# Patient Record
Sex: MALE | Race: WHITE | NOT HISPANIC OR LATINO
[De-identification: names, ages, dates, MRNs, and addresses within clinical notes are randomized per-mention and may not be internally consistent; named-entity substitution may affect disease eponyms.]

---

## 2019-04-01 ENCOUNTER — FORM ENCOUNTER (OUTPATIENT)
Age: 34
End: 2019-04-01

## 2019-04-02 ENCOUNTER — OUTPATIENT (OUTPATIENT)
Dept: OUTPATIENT SERVICES | Facility: HOSPITAL | Age: 34
LOS: 1 days | End: 2019-04-02
Payer: COMMERCIAL

## 2019-04-02 ENCOUNTER — APPOINTMENT (OUTPATIENT)
Dept: ORTHOPEDIC SURGERY | Facility: CLINIC | Age: 34
End: 2019-04-02
Payer: COMMERCIAL

## 2019-04-02 PROBLEM — Z00.00 ENCOUNTER FOR PREVENTIVE HEALTH EXAMINATION: Status: ACTIVE | Noted: 2019-04-02

## 2019-04-02 LAB
B PERT IGG+IGM PNL SER: SIGNIFICANT CHANGE UP
COLOR FLD: SIGNIFICANT CHANGE UP
FLUID INTAKE SUBSTANCE CLASS: SIGNIFICANT CHANGE UP
FLUID SEGMENTED GRANULOCYTES: 88 % — SIGNIFICANT CHANGE UP
MONOS+MACROS # FLD: 12 % — SIGNIFICANT CHANGE UP
RCV VOL RI: HIGH /UL (ref 0–5)
SPECIMEN SOURCE FLD: SIGNIFICANT CHANGE UP
SYNOVIAL CRYSTALS CLARITY: SIGNIFICANT CHANGE UP
SYNOVIAL CRYSTALS COLOR: ABNORMAL
SYNOVIAL CRYSTALS ID: SIGNIFICANT CHANGE UP
SYNOVIAL CRYSTALS TUBE: SIGNIFICANT CHANGE UP
TOTAL NUCLEATED CELL COUNT, BODY FLUID: 3323 /UL — HIGH (ref 0–5)
TUBE TYPE: SIGNIFICANT CHANGE UP

## 2019-04-02 PROCEDURE — 73562 X-RAY EXAM OF KNEE 3: CPT

## 2019-04-02 PROCEDURE — 89051 BODY FLUID CELL COUNT: CPT

## 2019-04-02 PROCEDURE — 73562 X-RAY EXAM OF KNEE 3: CPT | Mod: 26,50

## 2019-04-02 PROCEDURE — 89060 EXAM SYNOVIAL FLUID CRYSTALS: CPT

## 2019-04-02 PROCEDURE — 99213 OFFICE O/P EST LOW 20 MIN: CPT

## 2019-04-09 NOTE — HISTORY OF PRESENT ILLNESS
[de-identified] : 33-year-old male presents for evaluation of right knee pain. Reports some mild nagging knee pain over several years, however 2 days ago right knee swelled up and became extremely painful without any inciting event. Pain continued to be severe yesterday and he spent the day and he had due to inability to flex the knee at all or ambulate. Swelling and pain improved significantly after taking 3xAleve yesterday. Able to weight bear, but it is painful.

## 2019-04-09 NOTE — PHYSICAL EXAM
[de-identified] : General: ADY, A&O x3, Appropriately Dressed\par Skin: Warm and Dry, Normal Turgor, no rashes\par Neuro: AOx3, Cranial nerves grossly intact\par Psych: Mood and affect appropriate\par \par Ambulates with antalgic gait, stif legged on Right\par \par Focused Examination of the [ ]  knee: \par Mild effusion. Mild warmth. No erythema. \par No crepitus with ROM. \par Range of motion 0-70, with pain with flexion \par No joint line tenderness.\par Stable to varus and valgus stress. \par Negative lachman, anterior and posterior drawer. \par 5/5 strength TA/GS/EHL. \par Neurovascularly intact distally.\par  [de-identified] : XR Dung knees show well preserved joint space, no degeneration, fracture, or other abnormalities noted.

## 2019-04-09 NOTE — ASSESSMENT
[FreeTextEntry1] : 32 yo Male with acute Right knee swelling x 2 days\par \par -Right knee aspiration yielded <10cc of clear blood tinged fluid without obvious signs of infection\par -Sent to lab for cell count, crystals, culture if able\par -MRI R knee ordered\par -Activity as tolerated until f/u\par -f/u for MRI results...\par \par

## 2019-04-09 NOTE — PROCEDURE
[de-identified] : Right knee aspiration:\par Verbal consent obtained, risks benefits clearly explained, normal sterile technique utilized. Joint was aspirated with 18ga needle, about 8 cc of clear blood tinged fluid obtained. No sediment or purulence noted. Patient tolerated well.\par

## 2019-04-24 ENCOUNTER — APPOINTMENT (OUTPATIENT)
Dept: ORTHOPEDIC SURGERY | Facility: CLINIC | Age: 34
End: 2019-04-24
Payer: COMMERCIAL

## 2019-04-24 DIAGNOSIS — M25.561 PAIN IN RIGHT KNEE: ICD-10-CM

## 2019-04-24 PROCEDURE — 99212 OFFICE O/P EST SF 10 MIN: CPT

## 2019-04-25 NOTE — ASSESSMENT
[FreeTextEntry1] : assessment:\par tendinosis of right quad tendon\par \par plan:\par -provided with home exercises\par -continue to strengthen quads\par -f/u PRN....

## 2019-04-25 NOTE — PHYSICAL EXAM
[de-identified] : right knee:\par tenderness at sup pole of patella\par ROm 0-145\par stable v/v\par stable lachman\par no joint line tenderness [de-identified] : right knee MRI:\par fissure of trochlea. small; area of tendinosis at quad insertion.

## 2019-04-25 NOTE — HISTORY OF PRESENT ILLNESS
[de-identified] : f/u after the MRI of the right knee. no episodes of pain since the last visit. his area of pain was previously pinpoint at the superior pole of the patella.